# Patient Record
Sex: FEMALE | Race: WHITE | NOT HISPANIC OR LATINO | URBAN - NONMETROPOLITAN AREA
[De-identification: names, ages, dates, MRNs, and addresses within clinical notes are randomized per-mention and may not be internally consistent; named-entity substitution may affect disease eponyms.]

---

## 2022-06-24 ENCOUNTER — LAB OUTSIDE AN ENCOUNTER (OUTPATIENT)
Dept: URBAN - NONMETROPOLITAN AREA CLINIC 2 | Facility: CLINIC | Age: 57
End: 2022-06-24

## 2022-06-24 ENCOUNTER — WEB ENCOUNTER (OUTPATIENT)
Dept: URBAN - NONMETROPOLITAN AREA CLINIC 2 | Facility: CLINIC | Age: 57
End: 2022-06-24

## 2022-06-24 ENCOUNTER — CLAIMS CREATED FROM THE CLAIM WINDOW (OUTPATIENT)
Dept: URBAN - NONMETROPOLITAN AREA CLINIC 2 | Facility: CLINIC | Age: 57
End: 2022-06-24
Payer: COMMERCIAL

## 2022-06-24 VITALS
WEIGHT: 282 LBS | BODY MASS INDEX: 40.37 KG/M2 | TEMPERATURE: 97.2 F | HEIGHT: 70 IN | HEART RATE: 68 BPM | DIASTOLIC BLOOD PRESSURE: 92 MMHG | SYSTOLIC BLOOD PRESSURE: 166 MMHG

## 2022-06-24 DIAGNOSIS — C20 RECTAL CANCER: ICD-10-CM

## 2022-06-24 DIAGNOSIS — R19.8 ALTERNATING CONSTIPATION AND DIARRHEA: ICD-10-CM

## 2022-06-24 DIAGNOSIS — Z12.11 COLON CANCER SCREENING: ICD-10-CM

## 2022-06-24 PROCEDURE — 99204 OFFICE O/P NEW MOD 45 MIN: CPT | Performed by: NURSE PRACTITIONER

## 2022-06-24 RX ORDER — SODIUM PICOSULFATE, MAGNESIUM OXIDE, AND ANHYDROUS CITRIC ACID 10; 3.5; 12 MG/160ML; G/160ML; G/160ML
160 ML LIQUID ORAL AS DIRECTED
Qty: 1 KIT | Refills: 0 | OUTPATIENT
Start: 2022-06-24 | End: 2022-06-26

## 2022-06-24 NOTE — PHYSICAL EXAM SKIN:
no rashes , no suspicious lesions Erythromycin Pregnancy And Lactation Text: This medication is Pregnancy Category B and is considered safe during pregnancy. It is also excreted in breast milk.

## 2022-08-04 ENCOUNTER — OFFICE VISIT (OUTPATIENT)
Dept: URBAN - METROPOLITAN AREA MEDICAL CENTER 1 | Facility: MEDICAL CENTER | Age: 57
End: 2022-08-04
Payer: COMMERCIAL

## 2022-08-04 DIAGNOSIS — Z85.048 HISTORY OF ANAL CANCER: ICD-10-CM

## 2022-08-04 DIAGNOSIS — D12.4 ADENOMA OF DESCENDING COLON: ICD-10-CM

## 2022-08-04 DIAGNOSIS — D12.0 ADENOMA OF CECUM: ICD-10-CM

## 2022-08-04 DIAGNOSIS — D12.2 ADENOMA OF ASCENDING COLON: ICD-10-CM

## 2022-08-04 PROCEDURE — 45385 COLONOSCOPY W/LESION REMOVAL: CPT | Performed by: INTERNAL MEDICINE

## 2023-08-17 ENCOUNTER — LAB OUTSIDE AN ENCOUNTER (OUTPATIENT)
Dept: URBAN - NONMETROPOLITAN AREA CLINIC 2 | Facility: CLINIC | Age: 58
End: 2023-08-17

## 2023-08-17 ENCOUNTER — OFFICE VISIT (OUTPATIENT)
Dept: URBAN - NONMETROPOLITAN AREA CLINIC 2 | Facility: CLINIC | Age: 58
End: 2023-08-17
Payer: COMMERCIAL

## 2023-08-17 VITALS
BODY MASS INDEX: 38.97 KG/M2 | SYSTOLIC BLOOD PRESSURE: 145 MMHG | DIASTOLIC BLOOD PRESSURE: 81 MMHG | HEART RATE: 69 BPM | HEIGHT: 70 IN | WEIGHT: 272.2 LBS

## 2023-08-17 DIAGNOSIS — R10.13 EPIGASTRIC PAIN: ICD-10-CM

## 2023-08-17 DIAGNOSIS — R19.5 LOOSE STOOLS: ICD-10-CM

## 2023-08-17 DIAGNOSIS — R12 HEARTBURN: ICD-10-CM

## 2023-08-17 PROBLEM — 16331000: Status: ACTIVE | Noted: 2023-08-17

## 2023-08-17 PROCEDURE — 99214 OFFICE O/P EST MOD 30 MIN: CPT

## 2023-08-17 RX ORDER — PANTOPRAZOLE SODIUM 20 MG/1
1 TABLET TABLET, DELAYED RELEASE ORAL ONCE A DAY
Qty: 90 TABLET | Refills: 1 | OUTPATIENT
Start: 2023-08-17

## 2023-08-17 RX ORDER — SUCRALFATE 1 G/1
1 TABLET ON AN EMPTY STOMACH TABLET ORAL TWICE A DAY
Qty: 60 | Refills: 3 | OUTPATIENT
Start: 2023-08-17 | End: 2023-09-16

## 2023-08-17 RX ORDER — SUCRALFATE 1 G/1
TAKE 1 TABLET BY MOUTH 4 TIMES DAILY (BEFORE MEALS AND NIGHTLY) TABLET ORAL
Qty: 90 EACH | Refills: 1 | Status: ACTIVE | COMMUNITY

## 2023-08-17 RX ORDER — COLESTIPOL HYDROCHLORIDE 1 G/1
2 TABLETS TABLET ORAL ONCE A DAY
Qty: 60 | OUTPATIENT
Start: 2023-08-17

## 2023-08-17 NOTE — HPI-TODAY'S VISIT:
6/24/2022 Gail is a 56-year-old female who has a history of rectal adenocarcinoma diagnosed in 2015 by Dr. Jerome.  She underwent chemo and radiation status post diverting ileostomy and rectal resection with reanastomosis.  Her last colonoscopy was in 2016 by Dr. Jerome with intact anastomosis and no recurrent disease.  She is does have a repeat colonoscopy in 2019 at the 3-year interval however she did not due to the pandemic.  Her bowels are alternating between constipation and diarrhea.  She does not take anything for this regularly.  Today she agrees to pursue repeat surveillance colonoscopy.  MB  8/17/2023 Mrs. Segura returns to clinic with complaints of upper GI symptoms. She is Worried she has an ulcer. Experiencing Epigastric burning a few months, worse on empty stomach . 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 She does have a history of CCY with alternating diarrheal episodes. Denies history of stomach cancer in family. Takes ibuprofen occassionally  No alcohol no smoking. Endorses increased life stress. Denies observance of melena or BPR, denies dysphagia or odynophagia. SP

## 2023-10-13 ENCOUNTER — OFFICE VISIT (OUTPATIENT)
Dept: URBAN - NONMETROPOLITAN AREA SURGERY CENTER 1 | Facility: SURGERY CENTER | Age: 58
End: 2023-10-13
Payer: COMMERCIAL

## 2023-10-13 DIAGNOSIS — K29.70 GASTRITIS, UNSPECIFIED, WITHOUT BLEEDING: ICD-10-CM

## 2023-10-13 DIAGNOSIS — R10.13 DYSPEPSIA: ICD-10-CM

## 2023-10-13 DIAGNOSIS — K57.10 DIVERTICULA OF SMALL INTESTINE: ICD-10-CM

## 2023-10-13 DIAGNOSIS — K22.70 BARRETT ESOPHAGUS: ICD-10-CM

## 2023-10-13 DIAGNOSIS — K29.60 ADENOPAPILLOMATOSIS GASTRICA: ICD-10-CM

## 2023-10-13 DIAGNOSIS — K22.9 IRREGULAR Z LINE OF ESOPHAGUS: ICD-10-CM

## 2023-10-13 DIAGNOSIS — K31.7 BENIGN GASTRIC POLYP: ICD-10-CM

## 2023-10-13 DIAGNOSIS — K31.7 MULTIPLE GASTRIC POLYPS: ICD-10-CM

## 2023-10-13 PROCEDURE — 00731 ANES UPR GI NDSC PX NOS: CPT | Performed by: NURSE ANESTHETIST, CERTIFIED REGISTERED

## 2023-10-13 PROCEDURE — G8907 PT DOC NO EVENTS ON DISCHARG: HCPCS | Performed by: INTERNAL MEDICINE

## 2023-10-13 PROCEDURE — 43239 EGD BIOPSY SINGLE/MULTIPLE: CPT | Performed by: INTERNAL MEDICINE

## 2023-10-13 RX ORDER — PANTOPRAZOLE SODIUM 20 MG/1
1 TABLET TABLET, DELAYED RELEASE ORAL ONCE A DAY
Qty: 90 TABLET | Refills: 1 | Status: ACTIVE | COMMUNITY
Start: 2023-08-17

## 2023-10-13 RX ORDER — SUCRALFATE 1 G/1
TAKE 1 TABLET BY MOUTH 4 TIMES DAILY (BEFORE MEALS AND NIGHTLY) TABLET ORAL
Qty: 90 EACH | Refills: 1 | Status: ACTIVE | COMMUNITY

## 2023-10-13 RX ORDER — COLESTIPOL HYDROCHLORIDE 1 G/1
2 TABLETS TABLET ORAL ONCE A DAY
Qty: 60 | Status: ACTIVE | COMMUNITY
Start: 2023-08-17

## 2023-12-29 ENCOUNTER — DASHBOARD ENCOUNTERS (OUTPATIENT)
Age: 58
End: 2023-12-29

## 2023-12-29 ENCOUNTER — OFFICE VISIT (OUTPATIENT)
Dept: URBAN - NONMETROPOLITAN AREA CLINIC 13 | Facility: CLINIC | Age: 58
End: 2023-12-29
Payer: COMMERCIAL

## 2023-12-29 VITALS
DIASTOLIC BLOOD PRESSURE: 86 MMHG | SYSTOLIC BLOOD PRESSURE: 140 MMHG | HEIGHT: 70 IN | HEART RATE: 72 BPM | WEIGHT: 266 LBS | BODY MASS INDEX: 38.08 KG/M2

## 2023-12-29 DIAGNOSIS — R19.8 ALTERNATING CONSTIPATION AND DIARRHEA: ICD-10-CM

## 2023-12-29 DIAGNOSIS — Z12.11 COLON CANCER SCREENING: ICD-10-CM

## 2023-12-29 DIAGNOSIS — R10.13 EPIGASTRIC PAIN: ICD-10-CM

## 2023-12-29 DIAGNOSIS — C20 RECTAL CANCER: ICD-10-CM

## 2023-12-29 DIAGNOSIS — R12 HEARTBURN: ICD-10-CM

## 2023-12-29 DIAGNOSIS — R19.5 LOOSE STOOLS: ICD-10-CM

## 2023-12-29 PROBLEM — 79922009: Status: ACTIVE | Noted: 2023-08-17

## 2023-12-29 PROBLEM — 305058001: Status: ACTIVE | Noted: 2022-06-24

## 2023-12-29 PROBLEM — 363351006: Status: ACTIVE | Noted: 2022-06-24

## 2023-12-29 PROBLEM — 398032003: Status: ACTIVE | Noted: 2023-08-17

## 2023-12-29 PROBLEM — 249517009: Status: ACTIVE | Noted: 2022-06-24

## 2023-12-29 PROCEDURE — 99214 OFFICE O/P EST MOD 30 MIN: CPT

## 2023-12-29 RX ORDER — COLESTIPOL HYDROCHLORIDE 1 G/1
2 TABLETS TABLET ORAL ONCE A DAY
Qty: 60 | Status: ACTIVE | COMMUNITY
Start: 2023-08-17

## 2023-12-29 RX ORDER — PANTOPRAZOLE SODIUM 20 MG/1
1 TABLET TABLET, DELAYED RELEASE ORAL ONCE A DAY
Qty: 90 TABLET | Refills: 1 | Status: ACTIVE | COMMUNITY
Start: 2023-08-17

## 2023-12-29 RX ORDER — SUCRALFATE 1 G/1
TAKE 1 TABLET BY MOUTH 4 TIMES DAILY (BEFORE MEALS AND NIGHTLY) TABLET ORAL
Qty: 90 EACH | Refills: 1 | Status: ACTIVE | COMMUNITY

## 2023-12-29 RX ORDER — PANTOPRAZOLE SODIUM 20 MG/1
1 TABLET TABLET, DELAYED RELEASE ORAL ONCE A DAY
Qty: 90 TABLET | Refills: 3
Start: 2023-08-17

## 2023-12-29 RX ORDER — HYOSCYAMINE SULFATE 0.12 MG/1
1 TABLET UNDER THE TONGUE AND ALLOW TO DISSOLVE AS NEEDED TABLET, ORALLY DISINTEGRATING ORAL THREE TIMES A DAY
Qty: 30 | Refills: 1 | OUTPATIENT
Start: 2023-12-29 | End: 2024-01-18

## 2023-12-29 NOTE — HPI-TODAY'S VISIT:
6/24/2022 Gail is a 56-year-old female who has a history of rectal adenocarcinoma diagnosed in 2015 by Dr. Jerome.  She underwent chemo and radiation status post diverting ileostomy and rectal resection with reanastomosis.  Her last colonoscopy was in 2016 by Dr. Jerome with intact anastomosis and no recurrent disease.  She is does have a repeat colonoscopy in 2019 at the 3-year interval however she did not due to the pandemic.  Her bowels are alternating between constipation and diarrhea.  She does not take anything for this regularly.  Today she agrees to pursue repeat surveillance colonoscopy.  MB  8/17/2023 Mrs. Segura returns to clinic with complaints of upper GI symptoms. She is Worried she has an ulcer. Experiencing Epigastric burning a few months, worse on empty stomach . 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 She does have a history of CCY with alternating diarrheal episodes. Denies history of stomach cancer in family. Takes ibuprofen occassionally  No alcohol no smoking. Endorses increased life stress. Denies observance of melena or BPR, denies dysphagia or odynophagia. SP 12/29/2023 Ms. Segura returns to clinic for follow-up after EGD 10/13/2023 with Dr. Pena with findings of bilious gastric fluid, irregular Z-line 35 cm from the incisors, multiple gastric polyps, gastritis, nonbleeding duodenal diverticulum Mack's esophagus on path. She is continued on pantoprazole 20 mg daily.  All of her GI symptoms have improved after starting PPI and Colestid.  She is no longer taking Carafate.  Today she feels quite well and her bowel habits are well-managed.  We discussed the recommendations for repeat EGD in 1 year and continue PPI therapy.  She will follow-up with Dr. Pena and schedule her repeat EGD at that time. 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 will try Levsin prn. SP

## 2024-08-28 ENCOUNTER — OFFICE VISIT (OUTPATIENT)
Dept: URBAN - NONMETROPOLITAN AREA CLINIC 2 | Facility: CLINIC | Age: 59
End: 2024-08-28
Payer: COMMERCIAL

## 2024-08-28 ENCOUNTER — LAB OUTSIDE AN ENCOUNTER (OUTPATIENT)
Dept: URBAN - NONMETROPOLITAN AREA CLINIC 2 | Facility: CLINIC | Age: 59
End: 2024-08-28

## 2024-08-28 VITALS
SYSTOLIC BLOOD PRESSURE: 165 MMHG | HEART RATE: 78 BPM | DIASTOLIC BLOOD PRESSURE: 93 MMHG | WEIGHT: 278 LBS | BODY MASS INDEX: 39.8 KG/M2 | HEIGHT: 70 IN

## 2024-08-28 DIAGNOSIS — Z86.010 HISTORY OF ADENOMATOUS POLYP OF COLON: ICD-10-CM

## 2024-08-28 DIAGNOSIS — Z85.038 HISTORY OF COLON CANCER: ICD-10-CM

## 2024-08-28 DIAGNOSIS — E78.5 HYPERLIPIDEMIA, UNSPECIFIED HYPERLIPIDEMIA TYPE: ICD-10-CM

## 2024-08-28 DIAGNOSIS — K57.10 DUODENAL DIVERTICULUM: ICD-10-CM

## 2024-08-28 DIAGNOSIS — K21.9 GASTROESOPHAGEAL REFLUX DISEASE, UNSPECIFIED WHETHER ESOPHAGITIS PRESENT: ICD-10-CM

## 2024-08-28 DIAGNOSIS — R19.8 IRREGULAR BOWEL HABITS: ICD-10-CM

## 2024-08-28 DIAGNOSIS — Z90.49 HISTORY OF COLON RESECTION: ICD-10-CM

## 2024-08-28 DIAGNOSIS — K22.70 BARRETT'S ESOPHAGUS WITHOUT DYSPLASIA: ICD-10-CM

## 2024-08-28 PROBLEM — 55822004: Status: ACTIVE | Noted: 2024-08-28

## 2024-08-28 PROBLEM — 235595009: Status: ACTIVE | Noted: 2024-08-28

## 2024-08-28 PROBLEM — 762275008: Status: ACTIVE | Noted: 2024-08-28

## 2024-08-28 PROBLEM — 427816007: Status: ACTIVE | Noted: 2024-08-28

## 2024-08-28 PROBLEM — 429699009: Status: ACTIVE | Noted: 2024-08-28

## 2024-08-28 PROBLEM — 302914006: Status: ACTIVE | Noted: 2024-08-28

## 2024-08-28 PROBLEM — 429047008: Status: ACTIVE | Noted: 2024-08-28

## 2024-08-28 PROBLEM — 444702007: Status: ACTIVE | Noted: 2024-08-28

## 2024-08-28 PROCEDURE — 99214 OFFICE O/P EST MOD 30 MIN: CPT | Performed by: INTERNAL MEDICINE

## 2024-08-28 RX ORDER — SUCRALFATE 1 G/1
1 TABLET ON AN EMPTY STOMACH TABLET ORAL PRN
Refills: 1 | Status: ACTIVE | COMMUNITY

## 2024-08-28 RX ORDER — PANTOPRAZOLE SODIUM 20 MG/1
1 TABLET TABLET, DELAYED RELEASE ORAL ONCE A DAY
Qty: 90 TABLET | Refills: 3 | Status: ACTIVE | COMMUNITY
Start: 2023-08-17

## 2024-08-28 NOTE — PHYSICAL EXAM EXTREMITIES:
Diabetes Mellitus:  Uncontrolled  Obtain Hgb A1c and fasting CMP prior to f/u visit, refer to orders  Continue Metformin 100 mg BID, Glipizide 10 mg BID, and Pioglitazone 15 mg QD  A1c Goal: Not Met  LDL Goal: Met  BP Goal: Met  Discussed Exercise and Weight Loss Goals with patient  Advised low fat/low sugar diet  Recommended endocrinologist Dr. Blair and partners for further eval and tx, refer to orders  Office locations and phone number to call for an appointment provided.     HTN:  Controlled    Continue Losartan 25 mg PO QD and Verapamil 300 mg PO QD  Encouraged patient to monitor BP at home     HLD:  Continue Rosuvastatin 20 mg PO QD  LDL Goal: Met  HDL Goal: Not Met, increase aerobic exercise  Triglycerides Goal: Not Met, adhere to low fat/low sugar diet  Increase Your Intake on Whole Grains, Fresh Fruit and Vegetables  Patient Education Completed on Starting or Maintaining Exercise Program  Patient Education Completed on Following a Low Fat/Low Sugar Diet    GERD:  Continue Pantoprazole 40 mg QD   Advised maintaining a bland diet  Avoid alcohol and NSAIDs  Small frequent meals  Avoid eating within 3 hours of bedtime  Encouraged weight loss    Allergic Rhinitis:  Controlled  Continue present management with OTC anti-histamine  Call office if symptoms uncontrolled or worsen    Acute Left Ankle Pain:  Cconsult with orthopedic surgeon for further evaluation and treatment, refer to orders  Recommended Gwen Ferrell and Mario Rios  Office locations and phone number to call for an appointment provided.  Advised use of footwear with ankle support  Apply ice/heat, whichever provides more relief  Symptoms may be due to tendonitis or acute sprain    Bilateral Plantar Fasciitis:  Discussed stretches in office  Avoid walking barefoot  Recommended footwear with cushion    Abnormal Breast US:  Obtain US breast diagnostic, refer to orders   -Phone # to schedule test provided   full range of motion , no edema

## 2024-08-28 NOTE — HPI-TODAY'S VISIT:
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 lives w . 2 kids. last ov w sixto 12/29/2023. on pantoprazole 20mg. but, taking at bedtime. 3/7 has some burning. no particular time of day. last egd showed barretts. d/w pt. bms were irreg.  now regular. has HLD. no recent labs.  on no meds. apparently statins led to LE cramps.

## 2024-09-16 ENCOUNTER — TELEPHONE ENCOUNTER (OUTPATIENT)
Dept: URBAN - METROPOLITAN AREA CLINIC 96 | Facility: CLINIC | Age: 59
End: 2024-09-16

## 2024-09-16 PROBLEM — 197321007: Status: ACTIVE | Noted: 2024-09-16

## 2025-02-26 ENCOUNTER — TELEPHONE ENCOUNTER (OUTPATIENT)
Dept: URBAN - NONMETROPOLITAN AREA CLINIC 2 | Facility: CLINIC | Age: 60
End: 2025-02-26

## 2025-02-26 RX ORDER — PANTOPRAZOLE SODIUM 20 MG/1
1 TABLET TABLET, DELAYED RELEASE ORAL ONCE A DAY
Qty: 90 TABLET | Refills: 3
Start: 2023-08-17

## 2025-02-27 ENCOUNTER — ERX REFILL RESPONSE (OUTPATIENT)
Dept: URBAN - NONMETROPOLITAN AREA CLINIC 2 | Facility: CLINIC | Age: 60
End: 2025-02-27

## 2025-02-27 RX ORDER — PANTOPRAZOLE SODIUM 20 MG/1
1 TABLET TABLET, DELAYED RELEASE ORAL ONCE A DAY
Qty: 90 TABLET | Refills: 3 | OUTPATIENT

## 2025-02-27 RX ORDER — PANTOPRAZOLE 20 MG/1
TAKE 1 TABLET BY MOUTH ONCE DAILY TABLET, DELAYED RELEASE ORAL
Qty: 90 TABLET | Refills: 0 | OUTPATIENT

## 2025-06-04 ENCOUNTER — TELEPHONE ENCOUNTER (OUTPATIENT)
Dept: URBAN - METROPOLITAN AREA CLINIC 96 | Facility: CLINIC | Age: 60
End: 2025-06-04

## 2025-06-04 ENCOUNTER — OFFICE VISIT (OUTPATIENT)
Dept: URBAN - NONMETROPOLITAN AREA CLINIC 2 | Facility: CLINIC | Age: 60
End: 2025-06-04
Payer: COMMERCIAL

## 2025-06-04 ENCOUNTER — TELEPHONE ENCOUNTER (OUTPATIENT)
Dept: URBAN - NONMETROPOLITAN AREA CLINIC 2 | Facility: CLINIC | Age: 60
End: 2025-06-04

## 2025-06-04 ENCOUNTER — LAB OUTSIDE AN ENCOUNTER (OUTPATIENT)
Dept: URBAN - NONMETROPOLITAN AREA CLINIC 2 | Facility: CLINIC | Age: 60
End: 2025-06-04

## 2025-06-04 DIAGNOSIS — K22.70 BARRETT'S ESOPHAGUS WITHOUT DYSPLASIA: ICD-10-CM

## 2025-06-04 DIAGNOSIS — K21.9 GASTROESOPHAGEAL REFLUX DISEASE, UNSPECIFIED WHETHER ESOPHAGITIS PRESENT: ICD-10-CM

## 2025-06-04 DIAGNOSIS — Z86.0101 H/O ADENOMATOUS POLYP OF COLON: ICD-10-CM

## 2025-06-04 DIAGNOSIS — E78.5 HYPERLIPIDEMIA, UNSPECIFIED HYPERLIPIDEMIA TYPE: ICD-10-CM

## 2025-06-04 DIAGNOSIS — Z78.9 DRUG INTOLERANCE: ICD-10-CM

## 2025-06-04 DIAGNOSIS — Z85.038 HISTORY OF COLON CANCER: ICD-10-CM

## 2025-06-04 DIAGNOSIS — R19.8 IRREGULAR BOWEL HABITS: ICD-10-CM

## 2025-06-04 DIAGNOSIS — K57.10 DUODENAL DIVERTICULUM: ICD-10-CM

## 2025-06-04 DIAGNOSIS — K76.0 FATTY LIVER: ICD-10-CM

## 2025-06-04 DIAGNOSIS — Z90.49 HISTORY OF COLON RESECTION: ICD-10-CM

## 2025-06-04 PROBLEM — 59037007: Status: ACTIVE | Noted: 2025-06-04

## 2025-06-04 PROCEDURE — 99214 OFFICE O/P EST MOD 30 MIN: CPT | Performed by: INTERNAL MEDICINE

## 2025-06-04 RX ORDER — SODIUM, POTASSIUM,MAG SULFATES 17.5-3.13G
177 MILLILITER SOLUTION, RECONSTITUTED, ORAL ORAL
Qty: 1 UNSPECIFIED | Refills: 0 | OUTPATIENT
Start: 2025-06-04 | End: 2025-06-05

## 2025-06-04 RX ORDER — PANTOPRAZOLE SODIUM 20 MG/1
1 TABLET 1/2 TO 1 HOUR BEFORE MORNING MEAL TABLET, DELAYED RELEASE ORAL ONCE A DAY
Qty: 90 TABLET | Refills: 3 | OUTPATIENT
Start: 2025-06-04

## 2025-06-04 RX ORDER — PANTOPRAZOLE 20 MG/1
TAKE 1 TABLET BY MOUTH ONCE DAILY TABLET, DELAYED RELEASE ORAL
Qty: 90 EACH | Refills: 1 | Status: ACTIVE | COMMUNITY

## 2025-06-04 NOTE — HPI-TODAY'S VISIT:
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 lives w  who is a diabetic. 2 kids. last ov 8/28/2024. on pantoprazole 20mg. was taking at bedtime. 3/7 had some burning. no particular time of day. changed to AC BF. better control now. bms still qd and nl. has HLD. labs 9/13/2024- high chol and LDL w nl LFTs. statins led to leg cramps. was to control w diet. has not lost wt. baseline US done to r/o fatty liver. hepatic steatosis seen...d/w pt.

## 2025-06-27 ENCOUNTER — CLAIMS CREATED FROM THE CLAIM WINDOW (OUTPATIENT)
Dept: URBAN - METROPOLITAN AREA CLINIC 117 | Facility: CLINIC | Age: 60
End: 2025-06-27
Payer: COMMERCIAL

## 2025-06-27 ENCOUNTER — TELEPHONE ENCOUNTER (OUTPATIENT)
Dept: URBAN - NONMETROPOLITAN AREA CLINIC 2 | Facility: CLINIC | Age: 60
End: 2025-06-27

## 2025-06-27 ENCOUNTER — OFFICE VISIT (OUTPATIENT)
Dept: URBAN - NONMETROPOLITAN AREA CLINIC 1 | Facility: CLINIC | Age: 60
End: 2025-06-27
Payer: COMMERCIAL

## 2025-06-27 DIAGNOSIS — K76.0 STEATOSIS OF LIVER: ICD-10-CM

## 2025-06-27 PROCEDURE — 76981 USE PARENCHYMA: CPT | Performed by: INTERNAL MEDICINE

## 2025-06-27 RX ORDER — PANTOPRAZOLE SODIUM 20 MG/1
1 TABLET 1/2 TO 1 HOUR BEFORE MORNING MEAL TABLET, DELAYED RELEASE ORAL ONCE A DAY
Qty: 90 TABLET | Refills: 3 | Status: ACTIVE | COMMUNITY
Start: 2025-06-04

## 2025-06-27 RX ORDER — PANTOPRAZOLE 20 MG/1
TAKE 1 TABLET BY MOUTH ONCE DAILY TABLET, DELAYED RELEASE ORAL
Qty: 90 EACH | Refills: 1 | Status: ACTIVE | COMMUNITY

## 2025-08-22 ENCOUNTER — CLAIMS CREATED FROM THE CLAIM WINDOW (OUTPATIENT)
Dept: URBAN - NONMETROPOLITAN AREA SURGERY CENTER 1 | Facility: SURGERY CENTER | Age: 60
End: 2025-08-22

## 2025-08-22 DIAGNOSIS — K63.89 OTHER SPECIFIED DISEASES OF INTESTINE: ICD-10-CM

## 2025-08-22 DIAGNOSIS — Z85.038 ADENOCARCINOMA, COLON, HX OF: ICD-10-CM

## 2025-08-22 DIAGNOSIS — D12.4 ADENOMA OF DESCENDING COLON: ICD-10-CM

## 2025-08-22 RX ORDER — PANTOPRAZOLE 20 MG/1
TAKE 1 TABLET BY MOUTH ONCE DAILY TABLET, DELAYED RELEASE ORAL
Qty: 90 EACH | Refills: 1 | Status: ACTIVE | COMMUNITY

## 2025-08-22 RX ORDER — PANTOPRAZOLE SODIUM 20 MG/1
1 TABLET 1/2 TO 1 HOUR BEFORE MORNING MEAL TABLET, DELAYED RELEASE ORAL ONCE A DAY
Qty: 90 TABLET | Refills: 3 | Status: ACTIVE | COMMUNITY
Start: 2025-06-04